# Patient Record
Sex: MALE | Race: WHITE | NOT HISPANIC OR LATINO | Employment: FULL TIME | URBAN - METROPOLITAN AREA
[De-identification: names, ages, dates, MRNs, and addresses within clinical notes are randomized per-mention and may not be internally consistent; named-entity substitution may affect disease eponyms.]

---

## 2017-03-17 ENCOUNTER — ALLSCRIPTS OFFICE VISIT (OUTPATIENT)
Dept: OTHER | Facility: OTHER | Age: 27
End: 2017-03-17

## 2017-03-17 DIAGNOSIS — Z87.898 PERSONAL HISTORY OF OTHER SPECIFIED CONDITIONS: ICD-10-CM

## 2017-03-27 ENCOUNTER — GENERIC CONVERSION - ENCOUNTER (OUTPATIENT)
Dept: OTHER | Facility: OTHER | Age: 27
End: 2017-03-27

## 2017-03-27 ENCOUNTER — APPOINTMENT (OUTPATIENT)
Dept: LAB | Facility: CLINIC | Age: 27
End: 2017-03-27
Payer: COMMERCIAL

## 2017-03-27 DIAGNOSIS — Z87.898 PERSONAL HISTORY OF OTHER SPECIFIED CONDITIONS: ICD-10-CM

## 2017-03-27 LAB
ALBUMIN SERPL BCP-MCNC: 4 G/DL (ref 3.5–5)
ALP SERPL-CCNC: 71 U/L (ref 46–116)
ALT SERPL W P-5'-P-CCNC: 33 U/L (ref 12–78)
ANION GAP SERPL CALCULATED.3IONS-SCNC: 8 MMOL/L (ref 4–13)
AST SERPL W P-5'-P-CCNC: 12 U/L (ref 5–45)
BASOPHILS # BLD AUTO: 0.03 THOUSANDS/ΜL (ref 0–0.1)
BASOPHILS NFR BLD AUTO: 0 % (ref 0–1)
BILIRUB SERPL-MCNC: 0.53 MG/DL (ref 0.2–1)
BUN SERPL-MCNC: 14 MG/DL (ref 5–25)
CALCIUM SERPL-MCNC: 9.1 MG/DL (ref 8.3–10.1)
CHLORIDE SERPL-SCNC: 103 MMOL/L (ref 100–108)
CO2 SERPL-SCNC: 30 MMOL/L (ref 21–32)
CREAT SERPL-MCNC: 0.92 MG/DL (ref 0.6–1.3)
EOSINOPHIL # BLD AUTO: 0.3 THOUSAND/ΜL (ref 0–0.61)
EOSINOPHIL NFR BLD AUTO: 4 % (ref 0–6)
ERYTHROCYTE [DISTWIDTH] IN BLOOD BY AUTOMATED COUNT: 12.7 % (ref 11.6–15.1)
GFR SERPL CREATININE-BSD FRML MDRD: >60 ML/MIN/1.73SQ M
GLUCOSE P FAST SERPL-MCNC: 90 MG/DL (ref 65–99)
HCT VFR BLD AUTO: 42.7 % (ref 36.5–49.3)
HGB BLD-MCNC: 14.5 G/DL (ref 12–17)
LYMPHOCYTES # BLD AUTO: 2.21 THOUSANDS/ΜL (ref 0.6–4.47)
LYMPHOCYTES NFR BLD AUTO: 29 % (ref 14–44)
MCH RBC QN AUTO: 30.1 PG (ref 26.8–34.3)
MCHC RBC AUTO-ENTMCNC: 34 G/DL (ref 31.4–37.4)
MCV RBC AUTO: 89 FL (ref 82–98)
MONOCYTES # BLD AUTO: 0.59 THOUSAND/ΜL (ref 0.17–1.22)
MONOCYTES NFR BLD AUTO: 8 % (ref 4–12)
NEUTROPHILS # BLD AUTO: 4.37 THOUSANDS/ΜL (ref 1.85–7.62)
NEUTS SEG NFR BLD AUTO: 59 % (ref 43–75)
NRBC BLD AUTO-RTO: 0 /100 WBCS
PLATELET # BLD AUTO: 195 THOUSANDS/UL (ref 149–390)
PMV BLD AUTO: 12.9 FL (ref 8.9–12.7)
POTASSIUM SERPL-SCNC: 4 MMOL/L (ref 3.5–5.3)
PROT SERPL-MCNC: 7.4 G/DL (ref 6.4–8.2)
RBC # BLD AUTO: 4.81 MILLION/UL (ref 3.88–5.62)
SODIUM SERPL-SCNC: 141 MMOL/L (ref 136–145)
TSH SERPL DL<=0.05 MIU/L-ACNC: 3.45 UIU/ML (ref 0.36–3.74)
WBC # BLD AUTO: 7.54 THOUSAND/UL (ref 4.31–10.16)

## 2017-03-27 PROCEDURE — 80053 COMPREHEN METABOLIC PANEL: CPT

## 2017-03-27 PROCEDURE — 85025 COMPLETE CBC W/AUTO DIFF WBC: CPT

## 2017-03-27 PROCEDURE — 84443 ASSAY THYROID STIM HORMONE: CPT

## 2017-03-27 PROCEDURE — 36415 COLL VENOUS BLD VENIPUNCTURE: CPT

## 2017-04-19 ENCOUNTER — ALLSCRIPTS OFFICE VISIT (OUTPATIENT)
Dept: OTHER | Facility: OTHER | Age: 27
End: 2017-04-19

## 2017-05-02 ENCOUNTER — ALLSCRIPTS OFFICE VISIT (OUTPATIENT)
Dept: OTHER | Facility: OTHER | Age: 27
End: 2017-05-02

## 2017-07-26 ENCOUNTER — ALLSCRIPTS OFFICE VISIT (OUTPATIENT)
Dept: OTHER | Facility: OTHER | Age: 27
End: 2017-07-26

## 2017-07-27 ENCOUNTER — HOSPITAL ENCOUNTER (OUTPATIENT)
Dept: RADIOLOGY | Facility: HOSPITAL | Age: 27
Discharge: HOME | End: 2017-07-27
Payer: COMMERCIAL

## 2017-07-27 ENCOUNTER — ALLSCRIPTS OFFICE VISIT (OUTPATIENT)
Dept: OTHER | Facility: OTHER | Age: 27
End: 2017-07-27

## 2017-07-27 ENCOUNTER — GENERIC CONVERSION - ENCOUNTER (OUTPATIENT)
Dept: OTHER | Facility: OTHER | Age: 27
End: 2017-07-27

## 2017-07-27 ENCOUNTER — TRANSCRIBE ORDERS (OUTPATIENT)
Dept: ADMINISTRATIVE | Facility: HOSPITAL | Age: 27
End: 2017-07-27

## 2017-07-27 DIAGNOSIS — M79.672 PAIN OF LEFT FOOT: ICD-10-CM

## 2017-07-27 PROCEDURE — 73630 X-RAY EXAM OF FOOT: CPT

## 2017-10-02 ENCOUNTER — ALLSCRIPTS OFFICE VISIT (OUTPATIENT)
Dept: OTHER | Facility: OTHER | Age: 27
End: 2017-10-02

## 2017-10-02 LAB — S PYO AG THROAT QL: NEGATIVE

## 2017-10-04 ENCOUNTER — GENERIC CONVERSION - ENCOUNTER (OUTPATIENT)
Dept: OTHER | Facility: OTHER | Age: 27
End: 2017-10-04

## 2017-10-23 ENCOUNTER — GENERIC CONVERSION - ENCOUNTER (OUTPATIENT)
Dept: OTHER | Facility: OTHER | Age: 27
End: 2017-10-23

## 2017-11-15 ENCOUNTER — ALLSCRIPTS OFFICE VISIT (OUTPATIENT)
Dept: OTHER | Facility: OTHER | Age: 27
End: 2017-11-15

## 2018-01-10 NOTE — RESULT NOTES
Verified Results  * XR FOOT 3+ VIEW LEFT [id number] 03:02PM Angelica COLLINS Order Number: [id number]   Performing Comments: 27 y/o M with left foot pain for 2-3 days r/o stress fracture     Test Name Result Flag Reference   XR FOOT 3+ VW LEFT (Report)     LEFT FOOT     INDICATION: Pain at ball of left foot for 2 days  COMPARISON: None     VIEWS: 3     IMAGES: 3     FINDINGS:     There is no acute fracture or dislocation  No degenerative changes  No lytic or blastic lesions are seen  Soft tissues are unremarkable  IMPRESSION:     Normal examination         Workstation performed: [id number]     Signed by:   Pamela Myers MD   7/27/17

## 2018-01-11 NOTE — MISCELLANEOUS
Jamil Caba    Dear Mr Franchesca Horton ,  It has become apparent that we will no longer be able to maintain a physician-patient relationship  You missed four appointments  with Dr Bharat Iglesias in the last seven months without giving us the proper notice to reschedule the appointment  The office policy is to please call at least 24 hours prior to your appointment  time  Missed appointments jeopardize strong physician-patient relationships  The appointment you missed could have easily been made available to another patient if you had contacted us to cancel  We like to accommodate all of our patients, but when patients  miss an appointment it prevents us from being able to help everyone  Consequently, we are discharging you from our practice and respectfully request that you make other arrangements for your care  Our practice will provide emergency care for the next  30 days from your receipt of this letter  We suggest that you go to the nearest Emergency Department in the event of an emergency medical condition and promptly make arrangements for your future care  Please have your new physicians office  contact our office and we will be happy to forward your medical records accordingly    Respectfully,    D[facility]

## 2018-01-12 NOTE — MISCELLANEOUS
Provider Comments  Provider Comments:   PT WAS A NO SHOW FOR HIS [facility] APPT WITH DR PURDY ON 07/26/2017 / CS18      Signatures   Electronically signed by : Priscilla Quinn DO; Jul 28 2017 11:41AM EST                       (Author)

## 2018-01-12 NOTE — MISCELLANEOUS
Re:  Missed Appointment     Dear Mr Clemons Expose,  [facility] records indicate that you missed another appointment on 5/2/2017  Our office requires 24 hours advance notice to allow us to utilize your appointment for other patients requiring services  As always, we are interested  in your health care needs and wish to continue providing care for you  However, we ask that you please follow our cancellation policy in the future if you need to cancel your appointment  Please contact our office at [phone] to reschedule  your appointment if you have not already done so      Sincerely,    [facility]  Internal Medicine        Electronically signed by:Roxanna Matias   May  2 2017 10:46AM EST Administrative

## 2018-01-13 VITALS
TEMPERATURE: 97.8 F | BODY MASS INDEX: 36.43 KG/M2 | DIASTOLIC BLOOD PRESSURE: 78 MMHG | SYSTOLIC BLOOD PRESSURE: 122 MMHG | WEIGHT: 240.38 LBS | HEART RATE: 70 BPM | RESPIRATION RATE: 18 BRPM | OXYGEN SATURATION: 98 % | HEIGHT: 68 IN

## 2018-01-13 VITALS
HEART RATE: 102 BPM | HEIGHT: 68 IN | TEMPERATURE: 98.1 F | SYSTOLIC BLOOD PRESSURE: 126 MMHG | OXYGEN SATURATION: 97 % | DIASTOLIC BLOOD PRESSURE: 78 MMHG | WEIGHT: 230.25 LBS | BODY MASS INDEX: 34.9 KG/M2 | RESPIRATION RATE: 18 BRPM

## 2018-01-13 NOTE — MISCELLANEOUS
Provider Comments  Provider Comments:   Patient's 4th no show to office        Signatures   Electronically signed by : Aditya Llamas DO; Nov 15 2017  1:00PM EST                       (Author)

## 2018-01-14 VITALS
HEIGHT: 68 IN | DIASTOLIC BLOOD PRESSURE: 84 MMHG | TEMPERATURE: 98.6 F | SYSTOLIC BLOOD PRESSURE: 132 MMHG | RESPIRATION RATE: 16 BRPM | WEIGHT: 233 LBS | BODY MASS INDEX: 35.31 KG/M2 | HEART RATE: 88 BPM | OXYGEN SATURATION: 97 %

## 2018-01-14 NOTE — MISCELLANEOUS
Provider Comments  Provider Comments:   4/19/17-pt  missed appt  today  cw      Signatures   Electronically signed by : Clari Lloyd DO;  Apr 20 2017 11:58AM EST                       (Author)

## 2018-01-15 NOTE — RESULT NOTES
Verified Results  (1) TSH WITH FT4 REFLEX 27Mar2017 08:45AM [place]    Order Number: [id number]     Test Name Result Flag Reference   TSH 3 450 uIU/mL  0 358-3 740   Patients undergoing fluorescein dye angiography may retain small amounts of fluorescein in the body for 48-72 hours post procedure  Samples containing fluorescein can produce falsely depressed TSH values  If the patient had this procedure,a specimen should be resubmitted post fluorescein clearance  (1) COMPREHENSIVE METABOLIC PANEL [id number] 67:38NX [place]    Order Number: [id number]     Test Name Result Flag Reference   SODIUM 141 mmol/L  136-145   POTASSIUM 4 0 mmol/L  3 5-5 3   CHLORIDE 103 mmol/L  100-108   CARBON DIOXIDE 30 mmol/L  21-32   ANION GAP (CALC) 8 mmol/L  4-13   BLOOD UREA NITROGEN 14 mg/dL  5-25   CREATININE 0 92 mg/dL  0 60-1 30   Standardized to IDMS reference method   CALCIUM 9 1 mg/dL  8 3-10 1   BILI, TOTAL 0 53 mg/dL  0 20-1 00   ALK PHOSPHATAS 71 U/L     ALT (SGPT) 33 U/L  12-78   AST(SGOT) 12 U/L  5-45   ALBUMIN 4 0 g/dL  3 5-5 0   TOTAL PROTEIN 7 4 g/dL  6 4-8 2   eGFR Non-African American      >60 0 ml/min/1 73sq m   [facility] Disease Education Program recommendations are as follows:  GFR calculation is accurate only with a steady state creatinine  Chronic Kidney disease less than 60 ml/min/1 73 sq  meters  Kidney failure less than 15 ml/min/1 73 sq  meters     GLUCOSE FASTING 90 mg/dL  65-99     (1) CBC/PLT/DIFF [id number] 08:45AM [place]    Order Number: [id number]     Test Name Result Flag Reference   WBC COUNT 7 54 Thousand/uL  4 31-10 16   RBC COUNT 4 81 Million/uL  3 88-5 62   HEMOGLOBIN 14 5 g/dL  12 0-17 0   HEMATOCRIT 42 7 %  36 5-49 3   MCV 89 fL  82-98   MCH 30 1 pg  26 8-34 3   MCHC 34 0 g/dL  31 4-37 4   RDW 12 7 %  11 6-15 1   MPV 12 9 fL H 8 9-12 7   PLATELET COUNT 173 Thousands/uL  149-390   nRBC AUTOMATED 0 /100 WBCs     NEUTROPHILS RELATIVE PERCENT 59 %  43-75   LYMPHOCYTES RELATIVE PERCENT 29 %  14-44   MONOCYTES RELATIVE PERCENT 8 %  4-12   EOSINOPHILS RELATIVE PERCENT 4 %  0-6   BASOPHILS RELATIVE PERCENT 0 %  0-1   NEUTROPHILS ABSOLUTE COUNT 4 37 Thousands/? ??L  1 85-7 62   LYMPHOCYTES ABSOLUTE COUNT 2 21 Thousands/? ??L  0 60-4 47   MONOCYTES ABSOLUTE COUNT 0 59 Thousand/? ??L  0 17-1 22   EOSINOPHILS ABSOLUTE COUNT 0 30 Thousand/? ??L  0 00-0 61   BASOPHILS ABSOLUTE COUNT 0 03 Thousands/? ??L  0 00-0 10   - Patient Instructions: This bloodwork is non-fasting  Please drink two glasses of water morning of bloodwork  - Patient Instructions: This bloodwork is non-fasting  Please drink two glasses of water morning of bloodwork

## 2018-01-16 NOTE — MISCELLANEOUS
Message   Recorded as Task   Date: 10/04/2017 01:49 PM, Created By: Yancy Lopez   Task Name: Follow Up   Assigned To: Manny Rice   Regarding Patient: Michelle Schmidt, Status: Active   Comment:    Michelle Auguste - 04 Oct 2017 1:49 PM     TASK CREATED  Caller: Self; General Medical Question; [phone] (Home)  PT WANTS A DIFFERENT COUGH [address]  HE HAS NOT BEEN SLEEPING, EVEN ON THE COUGH MED AND NYQUIL  HE IS REQUESTING SOMETHING WITH CODEINE IN IT  THE MED YOU GAVE HIM IS NOT WORKING  PHARMACY IS [address]  PLEASE CALL PT TO ADVISE           Plan  Acute upper respiratory infection    · Cheratussin -10 MG/5ML Oral Syrup; TAKE 1 TSP(5ml) Every 8 hours PRN  cough, may cause drowsiness    Signatures   Electronically signed by Michelle Palacios DO; Oct  4 2017  2:42PM EST                       (Author)

## 2018-01-18 NOTE — MISCELLANEOUS
Provider Comments  Provider Comments:   PT WAS A NO SHOW FOR HIS OV ON 05/02/2017 WITH DR PURDY /CS18      Signatures   Electronically signed by : Leonidas Alexander DO; May  2 2017 12:17PM EST                       (Author)

## 2018-01-22 VITALS
RESPIRATION RATE: 16 BRPM | HEART RATE: 86 BPM | OXYGEN SATURATION: 98 % | HEIGHT: 68 IN | BODY MASS INDEX: 35.69 KG/M2 | DIASTOLIC BLOOD PRESSURE: 86 MMHG | WEIGHT: 235.5 LBS | TEMPERATURE: 97.9 F | SYSTOLIC BLOOD PRESSURE: 132 MMHG